# Patient Record
Sex: MALE | Race: WHITE | NOT HISPANIC OR LATINO | Employment: OTHER | URBAN - METROPOLITAN AREA
[De-identification: names, ages, dates, MRNs, and addresses within clinical notes are randomized per-mention and may not be internally consistent; named-entity substitution may affect disease eponyms.]

---

## 2019-10-18 ENCOUNTER — TRANSCRIBE ORDERS (OUTPATIENT)
Dept: ADMINISTRATIVE | Facility: HOSPITAL | Age: 69
End: 2019-10-18

## 2019-10-18 DIAGNOSIS — C44.719 BASAL CELL CARCINOMA, LEG, LEFT: Primary | ICD-10-CM

## 2019-11-12 ENCOUNTER — HOSPITAL ENCOUNTER (OUTPATIENT)
Dept: INFUSION THERAPY | Facility: HOSPITAL | Age: 69
Discharge: HOME | End: 2019-11-12
Admitting: PLASTIC SURGERY

## 2019-11-12 VITALS
DIASTOLIC BLOOD PRESSURE: 70 MMHG | RESPIRATION RATE: 16 BRPM | HEART RATE: 66 BPM | SYSTOLIC BLOOD PRESSURE: 128 MMHG | TEMPERATURE: 96.1 F | OXYGEN SATURATION: 99 %

## 2019-11-12 DIAGNOSIS — L98.9 SKIN LESION: Primary | ICD-10-CM

## 2019-11-12 PROCEDURE — 88305 TISSUE EXAM BY PATHOLOGIST: CPT | Performed by: PLASTIC SURGERY

## 2019-11-12 PROCEDURE — 88331 PATH CONSLTJ SURG 1 BLK 1SPC: CPT | Performed by: PLASTIC SURGERY

## 2019-11-12 RX ORDER — LEVOTHYROXINE SODIUM 0.1 MG/1
100 TABLET ORAL DAILY
COMMUNITY

## 2019-11-12 RX ORDER — BUPIVACAINE HYDROCHLORIDE AND EPINEPHRINE 2.5; 5 MG/ML; UG/ML
10 INJECTION, SOLUTION EPIDURAL; INFILTRATION; INTRACAUDAL; PERINEURAL ONCE
Status: COMPLETED | OUTPATIENT
Start: 2019-11-12 | End: 2019-11-12

## 2019-11-12 RX ORDER — TAMSULOSIN HYDROCHLORIDE 0.4 MG/1
1 CAPSULE ORAL DAILY
COMMUNITY

## 2019-11-12 RX ORDER — LIDOCAINE HYDROCHLORIDE AND EPINEPHRINE 10; 10 MG/ML; UG/ML
20 INJECTION, SOLUTION INFILTRATION; PERINEURAL ONCE
Status: COMPLETED | OUTPATIENT
Start: 2019-11-12 | End: 2019-11-12

## 2019-11-12 RX ORDER — OXCARBAZEPINE 300 MG/1
300 TABLET, FILM COATED ORAL 2 TIMES DAILY
COMMUNITY

## 2019-11-12 RX ORDER — BUPIVACAINE HYDROCHLORIDE AND EPINEPHRINE 2.5; 5 MG/ML; UG/ML
30 INJECTION, SOLUTION EPIDURAL; INFILTRATION; INTRACAUDAL; PERINEURAL ONCE
Status: DISCONTINUED | OUTPATIENT
Start: 2019-11-12 | End: 2019-11-12

## 2019-11-12 RX ADMIN — BUPIVACAINE HYDROCHLORIDE AND EPINEPHRINE BITARTRATE 10 ML: 2.5; .005 INJECTION, SOLUTION EPIDURAL; INFILTRATION; INTRACAUDAL; PERINEURAL at 12:59

## 2019-11-12 RX ADMIN — LIDOCAINE HYDROCHLORIDE,EPINEPHRINE BITARTRATE 20 ML: 10; .01 INJECTION, SOLUTION INFILTRATION; PERINEURAL at 12:59

## 2019-11-12 NOTE — PATIENT INSTRUCTIONS
MINOR SURGERY DISCHARGE INSTRUCTIONS    IMPORTANT:  The following information will help you return to your best level of health.  Wound Care:  ·  Your dressing may be removed:     Tomorrow  ·  If incision is on head or neck, cover your pillow with a towel.  ·  Avoid stooping over or heavy lifting.  ·  If the incision is near your ear, clean your telephones.  ·  You may use band aids after dressing is removed; if desired.  ·  Avoid sun exposure to site.  You may shower:  ·  Tomorrow  ·  Apply ointment to incision 1-2 times a day.  Remove old ointment first.  ·  Apply ice to area for 24 hours - 15 minutes on & 15 minutes off.  30 minutes on  & 30 minutes off while awake.  Use an extra pillow when sleeping.  ·  Elevate area for 24-48 hours to reduce swelling.  Medications:   Following this procedure, you should continue to take all other medications as  directed by your primary physician unless otherwise instructed. There have been  no changes to the medications you provided us (with the following exceptions, if  applicable):   You may use Tylenol or Advil for discomfort.   Other: _______________________________________________________  Activity:  ·  You may increase your activity as tolerated.  ·  You may resume normal activity:   Tomorrow  ·  No strenuous activity, lifting or sports:  ·   Until seen by your MD    Call your doctor to make an appointment for: 14 days    On (date) ________________________________  Physician: Dr. Luna  Office # [phone]

## 2019-11-12 NOTE — PROCEDURES
Pre-Operative Diagnosis: left leg bcc     Post-Operative Diagnosis: Same     Procedure Performed:   1. Excision malignant lesion left leg [id number]  2. Complex closure left leg 20mm [id number]     Surgeon: ALONA Luna MD     Assistant: None     Anesthesia: Local     Estimated Blood Loss: <5cc     Specimens: left leg bcc, short stitch superior long stitch lateral     Complications: None     Indications: Mr. Lobo presented after diagnosis of a basal cell skin cancer of the left anterior lower leg.  We discussed excision of this with 3 mm margin with frozen section to ensure adequate clearance.       We discussed risks, benefits and alternatives including but no limited to: bleeding, infection, asymmetry, poor or slow wound healing, need for further surgery, possible recurrence.  The patient elected to proceed.     Description of Procedure: The patient was met in the preoperative holding area.  All questions were answered and informed consent was assured.       The lesion was marked with a 3 mm margin around the biopsy site and a fusiform fashion excision designed was marked in the lines of relaxed skin tension.  The leg was prepped and draped in a sterile fashion.  Local anesthetic was infiltrated and a timeout was performed correctly identifying the patient, operative site, and procedure to be performed.  All present were in agreement.     The marked lines were incised full-thickness through the skin and the lesion was divided from the underlying tissue in the subcutaneous plane.  It was oriented and passed off the field.  Wide undermining was then performed to allow tension-free closure.  Bleeding was made hemostatic and the wound was irrigated.  Closure was performed with 4-0 Monocryl deep dermal layer and 5-0 nylon in the skin.  It was dressed with antibiotic ointment.    Frozen section pathology returned with clear margins.      All sponge, needle, and instrument counts were correct.

## 2019-11-12 NOTE — PROGRESS NOTES
Pt tolerated procedure well.  Pt given discharge instructions verbally and written.  Pt dc'ed per crutches with spouse.

## 2019-11-13 LAB
LAB AP CASE REPORT: NORMAL
LAB AP CLINICAL INFORMATION: NORMAL
LAB AP DIAGNOSIS COMMENT: NORMAL
Lab: NORMAL
PATH REPORT.FINAL DX SPEC: NORMAL
PATH REPORT.GROSS SPEC: NORMAL

## 2021-03-15 ENCOUNTER — BULK ORDERING (OUTPATIENT)
Dept: CASE MANAGEMENT | Facility: OTHER | Age: 71
End: 2021-03-15

## 2021-03-15 DIAGNOSIS — Z23 IMMUNIZATION DUE: ICD-10-CM
